# Patient Record
Sex: FEMALE | Race: WHITE | NOT HISPANIC OR LATINO | Employment: FULL TIME | URBAN - NONMETROPOLITAN AREA
[De-identification: names, ages, dates, MRNs, and addresses within clinical notes are randomized per-mention and may not be internally consistent; named-entity substitution may affect disease eponyms.]

---

## 2019-03-19 LAB
GLUCOSE 1H P 75 G GLC PO SERPL-MCNC: 102 MG/DL
GLUCOSE 2H P 75 G GLC PO SERPL-MCNC: 85 MG/DL
GLUCOSE P FAST SERPL-MCNC: 76 MG/DL (ref 65–91)

## 2019-03-28 LAB
# FETUSES US: 1
AFP ADJ MOM SERPL: 0.94
AFP INTERP SERPL-IMP: NORMAL
AFP SERPL-MCNC: 27.8 NG/ML
AGE: NORMAL
DONATED EGG PATIENT QL: NORMAL
GA CLIN EST: 16.9 WEEKS
GA METHOD: NORMAL
HX OF NTD NARR: NO
HX OF TRISOMY 21 NARR: NORMAL
IDDM PATIENT QL: NO
NEURAL TUBE DEFECT RISK FETUS: NORMAL %
SL AMB REPEAT SPECIMEN: NORMAL

## 2019-08-05 LAB
GP B STREP DNA SPEC QL NAA+PROBE: NOT DETECTED
SPECIMEN SOURCE: NORMAL

## 2020-01-30 LAB
CLINICAL INFO: NORMAL
CYTO CVX: NORMAL
CYTOLOGY CMNT CVX/VAG CYTO-IMP: NORMAL
DATE PREVIOUS BX: NORMAL
LMP START DATE: NORMAL
SL AMB PREV. PAP:: NORMAL
SPECIMEN SOURCE CVX/VAG CYTO: NORMAL

## 2022-01-04 ENCOUNTER — OFFICE VISIT (OUTPATIENT)
Dept: URGENT CARE | Facility: CLINIC | Age: 32
End: 2022-01-04
Payer: COMMERCIAL

## 2022-01-04 VITALS
TEMPERATURE: 98.4 F | DIASTOLIC BLOOD PRESSURE: 81 MMHG | HEART RATE: 90 BPM | WEIGHT: 190 LBS | BODY MASS INDEX: 29.82 KG/M2 | HEIGHT: 67 IN | SYSTOLIC BLOOD PRESSURE: 139 MMHG | OXYGEN SATURATION: 90 %

## 2022-01-04 DIAGNOSIS — L98.0 PYOGENIC GRANULOMA OF LIP: Primary | ICD-10-CM

## 2022-01-04 PROBLEM — Z86.79 HISTORY OF CARDIAC ARRHYTHMIA: Status: ACTIVE | Noted: 2019-06-20

## 2022-01-04 PROCEDURE — 99202 OFFICE O/P NEW SF 15 MIN: CPT | Performed by: PHYSICIAN ASSISTANT

## 2022-01-04 RX ORDER — ACETAMINOPHEN AND CODEINE PHOSPHATE 120; 12 MG/5ML; MG/5ML
0.35 SOLUTION ORAL DAILY
COMMUNITY
Start: 2021-12-02 | End: 2022-12-02

## 2022-01-04 NOTE — PATIENT INSTRUCTIONS
Pyogenic granuloma is a relatively common skin growth  It is usually a small red, oozing and bleeding bump that looks like raw hamburger meat  It often seems to follows a minor injury and grows rapidly over a period of a few weeks to an average size of a half an inch  The head, neck, upper trunk and hands and feet are the most commonly sites  Pyogenic granuloma can occur at any age, but is least common in the very young and the very old  It is seen most often in children, pregnant women ("pregnancy tumor") and those taking the drugs Indinavir, Soriatane, Accutane and oral contraceptives  Pyogenic granulomas are always benign growths  Still there is always a concern that they could be cancerous, and rarely a cancer can mimic pyogenic granuloma  A sample is usually obtained for biopsy analysis  This is particularly important since as many as half of treated cases will recur (grow back) and need a second treatment  Those that appear on the upper back in young adults are more prone to recur  At times multiple smaller pyogenic granulomas form following a treatment (these are known as "satellites")  It appears that pieces of pyogenic granuloma may spread though local blood vessels  Pyogenic granulomas in pregnant women may go away after delivery on their own, and sometimes waiting is the best strategy in those cases  Most pyogenic granulomas are scraped off with an instrument called a curette and lightly cauterized to decrease the chance they will re-grow  An injection of local anesthesia is required (lidocaine is used-similar to Novocaine)  Some doctors prefer to treat with chemicals (TCA, podophyllin, phenol, silver nitrate)  Laser surgery can also be done but has not been proven to be superior  The highest cure rates are obtained when the growth is removed by full thickness surgical excision, and closed with stitches

## 2022-01-04 NOTE — PROGRESS NOTES
[facility]        NAME: Haydee Gore is a 27 y o  female  : 1990    MRN: [id number]  DATE: 2022  TIME: 6:51 PM    Assessment and Plan   Pyogenic granuloma of lip [L98 0]  1  Pyogenic granuloma of lip  Ambulatory referral to Oral Maxillofacial Surgery         Patient Instructions   Patient Instructions   Pyogenic granuloma is a relatively common skin growth  It is usually a small red, oozing and bleeding bump that looks like raw hamburger meat  It often seems to follows a minor injury and grows rapidly over a period of a few weeks to an average size of a half an inch  The head, neck, upper trunk and hands and feet are the most commonly sites  Pyogenic granuloma can occur at any age, but is least common in the very young and the very old  It is seen most often in children, pregnant women ("pregnancy tumor") and those taking the drugs Indinavir, Soriatane, Accutane and oral contraceptives  Pyogenic granulomas are always benign growths  Still there is always a concern that they could be cancerous, and rarely a cancer can mimic pyogenic granuloma  A sample is usually obtained for biopsy analysis  This is particularly important since as many as half of treated cases will recur (grow back) and need a second treatment  Those that appear on the upper back in young adults are more prone to recur  At times multiple smaller pyogenic granulomas form following a treatment (these are known as "satellites")  It appears that pieces of pyogenic granuloma may spread though local blood vessels  Pyogenic granulomas in pregnant women may go away after delivery on their own, and sometimes waiting is the best strategy in those cases  Most pyogenic granulomas are scraped off with an instrument called a curette and lightly cauterized to decrease the chance they will re-grow  An injection of local anesthesia is required (lidocaine is used-similar to Novocaine)   Some doctors prefer to treat with chemicals (TCA, podophyllin, phenol, silver nitrate)  Laser surgery can also be done but has not been proven to be superior  The highest cure rates are obtained when the growth is removed by full thickness surgical excision, and closed with stitches  Follow up with PCP in 3-5 days  Proceed to  ER if symptoms worsen  Chief Complaint     Chief Complaint   Patient presents with    Oral Pain     2 WEEKS         History of Present Illness       -The patient presents for a lesion on the right bottom lip x 2 weeks  -She states it started as a painless pimple on her bottom lip  -She stats her daughter scratched the area about 1 5 weeks ago and she had some blood  -She states it has been gradually getting larger over the last 2 weeks  -She states she has bleeding if she has an injury  -No known h/o cold sores  -Denies tobacco use  -She used abreva for about 1 week-She has been using chapstick      Review of Systems   Review of Systems   Constitutional: Negative for chills and fever  HENT: Negative for tinnitus, trouble swallowing and voice change  Lesion on bottom lip         Current Medications       Current Outpatient Medications:     norethindrone (MICRONOR) 0 35 MG tablet, Take 0 35 mg by mouth daily, Disp: , Rfl:     Current Allergies     Allergies as of 01/04/2022 - Reviewed 01/04/2022   Allergen Reaction Noted    Sulfamethoxazole-trimethoprim GI Intolerance and Other (See Comments) 05/14/2014    Nitrofurantoin Rash 10/01/2018            The following portions of the patient's history were reviewed and updated as appropriate: allergies, current medications, past family history, past medical history, past social history, past surgical history and problem list      Past Medical History:   Diagnosis Date    Heart abnormality        History reviewed  No pertinent surgical history      Family History   Problem Relation Age of Onset    No Known Problems Mother     No Known Problems Father Medications have been verified  Objective   /81   Pulse 90   Temp 98 4 °F (36 9 °C)   Ht 5' 6 54" (1 69 m)   Wt 86 2 kg (190 lb)   SpO2 90%   BMI 30 18 kg/m²        Physical Exam     Physical Exam  Constitutional:       Appearance: Normal appearance  HENT:      Mouth/Throat:      Lips: Lesions present          Comments: Raised granuloma about 1 5cm in size on the corner of the left bottom lip with irregular color  -This resembles pyogenic granuloma   Neurological:      Mental Status: She is alert          -I suggest oral surgery for removal and biopsy  -Referral placed

## 2022-03-26 LAB
CLINICAL INFO: NORMAL
CYTO CVX: NORMAL
DATE PREVIOUS BX: NORMAL
LMP START DATE: NORMAL
SL AMB PREV. PAP:: NORMAL
SPECIMEN SOURCE CVX/VAG CYTO: NORMAL
